# Patient Record
Sex: MALE | HISPANIC OR LATINO | Employment: OTHER | URBAN - METROPOLITAN AREA
[De-identification: names, ages, dates, MRNs, and addresses within clinical notes are randomized per-mention and may not be internally consistent; named-entity substitution may affect disease eponyms.]

---

## 2022-03-23 ENCOUNTER — OFFICE VISIT (OUTPATIENT)
Dept: URGENT CARE | Facility: CLINIC | Age: 57
End: 2022-03-23

## 2022-03-23 VITALS
HEIGHT: 66 IN | TEMPERATURE: 99.4 F | SYSTOLIC BLOOD PRESSURE: 150 MMHG | RESPIRATION RATE: 14 BRPM | DIASTOLIC BLOOD PRESSURE: 80 MMHG | BODY MASS INDEX: 28.12 KG/M2 | WEIGHT: 175 LBS | HEART RATE: 85 BPM | OXYGEN SATURATION: 96 %

## 2022-03-23 DIAGNOSIS — S05.01XA ABRASION OF RIGHT CORNEA, INITIAL ENCOUNTER: ICD-10-CM

## 2022-03-23 DIAGNOSIS — H57.89 EYE IRRITATION: ICD-10-CM

## 2022-03-23 PROCEDURE — 99203 OFFICE O/P NEW LOW 30 MIN: CPT | Performed by: NURSE PRACTITIONER

## 2022-03-23 RX ORDER — TOBRAMYCIN 3 MG/ML
1 SOLUTION/ DROPS OPHTHALMIC EVERY 4 HOURS
Qty: 2 ML | Refills: 0 | Status: SHIPPED | OUTPATIENT
Start: 2022-03-23 | End: 2022-03-28

## 2022-03-23 ASSESSMENT — ENCOUNTER SYMPTOMS
MYALGIAS: 0
EYE PAIN: 0
SHORTNESS OF BREATH: 0
BLURRED VISION: 0
EYE DISCHARGE: 1
EYE ITCHING: 0
DIZZINESS: 0
VOMITING: 0
FOREIGN BODY SENSATION: 1
DOUBLE VISION: 0
EYE REDNESS: 1
PHOTOPHOBIA: 0
FEVER: 0
SORE THROAT: 0
NAUSEA: 0
CHILLS: 0

## 2022-03-23 NOTE — PROGRESS NOTES
Subjective:   Billy Gu is a 56 y.o. male who presents for Eye Problem ((R) red swelling/ feels like something in eye, (L) eye red. Itchy. throwing out trash and pt states something flew back into eye. X yesterday )    Patient Yi-speaking only  used for encounter  Eye Problem   The right eye is affected. This is a new problem. The current episode started yesterday (suspects a piece of debris from trash can possibly rock or dirt). The problem has been unchanged. The injury mechanism was a foreign body. The pain is at a severity of 2/10. The pain is mild. There is no known exposure to pink eye. He does not wear contacts. Associated symptoms include an eye discharge, eye redness and a foreign body sensation. Pertinent negatives include no blurred vision, double vision, fever, itching, nausea, photophobia, recent URI or vomiting. He has tried eye drops and water for the symptoms. The treatment provided no relief.       Review of Systems   Constitutional: Negative for chills and fever.   HENT: Negative for sore throat.    Eyes: Positive for discharge and redness. Negative for blurred vision, double vision, photophobia, pain and itching.   Respiratory: Negative for shortness of breath.    Cardiovascular: Negative for chest pain.   Gastrointestinal: Negative for nausea and vomiting.   Genitourinary: Negative for hematuria.   Musculoskeletal: Negative for myalgias.   Skin: Negative for rash.   Neurological: Negative for dizziness.       Medications:    • tobramycin Soln    Allergies: Patient has no known allergies.    Problem List: Billy Gu does not have a problem list on file.    Surgical History:  No past surgical history on file.    Past Social Hx: Billy Gu       Past Family Hx:  Billy Gu family history is not on file.     Problem list, medications, and allergies reviewed by myself today in Epic.     Objective:     /80   Pulse 85   Temp 37.4 °C (99.4 °F)   Resp 14   Ht 1.676 m (5'  "6\")   Wt 79.4 kg (175 lb)   SpO2 96%   BMI 28.25 kg/m²     Physical Exam  Constitutional:       Appearance: Normal appearance. He is not ill-appearing or toxic-appearing.   HENT:      Head: Normocephalic.      Right Ear: External ear normal.      Left Ear: External ear normal.      Nose: Nose normal.      Mouth/Throat:      Lips: Pink.      Mouth: Mucous membranes are moist.   Eyes:      General: Lids are normal.         Right eye: No foreign body or discharge.         Left eye: No foreign body or discharge.      Extraocular Movements: Extraocular movements intact.      Conjunctiva/sclera:      Right eye: Right conjunctiva is injected.      Left eye: Left conjunctiva is injected.      Pupils:      Right eye: Corneal abrasion present.      Slit lamp exam:     Right eye: No foreign body or photophobia.        Comments: Fluorescein and proparacaine applied to the eye.   Pulmonary:      Effort: Pulmonary effort is normal. No accessory muscle usage or respiratory distress.   Musculoskeletal:         General: Normal range of motion.      Cervical back: Full passive range of motion without pain.   Skin:     Coloration: Skin is not pale.   Neurological:      Mental Status: He is alert and oriented to person, place, and time.   Psychiatric:         Mood and Affect: Mood normal.         Thought Content: Thought content normal.       RIght eye: 20/20  Left 20/20  Both 20/20    Assessment/Plan:     Diagnosis and associated orders:   1. Abrasion of right cornea, initial encounter  tobramycin (TOBREX) 0.3 % Solution ophthalmic solution   2. Eye irritation          Comments/MDM:   Patient is a 56-year-old male present with stated above, no foreign body present.    Differential diagnosis, natural history, supportive care, and indications for immediate follow-up discussed.  • Your blood pressure is elevated here in Urgent Care. Please monitor your blood pressure over the next several days. If your blood pressure continues to be " 120/80 or higher please contact your physician for blood pressure management.                     Please note that this dictation was created using voice recognition software. I have made a reasonable attempt to correct obvious errors, but I expect that there are errors of grammar and possibly content that I did not discover before finalizing the note.    This note was electronically signed by Jose Angel CHRIS.